# Patient Record
(demographics unavailable — no encounter records)

---

## 2025-04-23 NOTE — HISTORY OF PRESENT ILLNESS
[FreeTextEntry1] : 69y/o presents to discuss ovarian cyst Pt was seen by her PCP for back pain who recommended seeing a neurologist. Pt sent for back MRI by neurologist where ovarian cyst was noted (MRI result not available for review) Pt reports back pain has since resolved Pt reports constant pelvic pain as well as urinary frequency, denies dysuria  Denies weight loss, early satiety, night sweats   TVUS from outside provider reviewed: L ovarian cyst measuring 6.6x5.9x5.1 with hypoechoic and anechoic area Pt denies hx of ovarian cysts

## 2025-04-23 NOTE — PLAN
[FreeTextEntry1] : 69y/o presents with new finding of 6.6cm L ovarian cyst on pelvic ultrasound from 3/8/25 -Ultrasound sent by PCP, results scanned into chart, results discussed with pt   -Discussed that large ovarian cysts in the postmenopausal periods warrant additional workup  -Recommend tumor makers (, CEA, CA 19-9, HE4) and pelvic MRI, as recommended by radiology -Briefly discussed surgical removal with benign GYN or GYN oncology   #Urinary frequency -Discussed this may be related to space occupying cyst putting pressure on the bladder -Will sent UCx today   RTO after MRI  nga HARGROVE

## 2025-05-02 NOTE — PLAN
[FreeTextEntry1] : 69y/o presents for followup regarding left ovarian cyst -Tumor markers reviewed and negative -MRI reviewed and pt counseled on findings -Pt counseled that labs and imaging are reassuring for benign process but carcinoma cannot be definitively ruled out without pathology. Recommend removal -Discussed recommendation of BSO in the postmenopausal setting - pt agrees to plan -Offered referral to GYN Oncology, discussed that if a cancer was identified on frozen section during surgical removal, a GYN oncologist could perform additional staging and likely hysterectomy at the time of surgery. Pt to consider and will contact office next week to start surgical booking process vs referral to GYN oncology   nga HARGROVE

## 2025-05-02 NOTE — HISTORY OF PRESENT ILLNESS
[FreeTextEntry1] : This visit was provided via telehealth using real-time 2-way audio visual technology. The patient, JESSICA FLORES  , was located at home, [address] [place] [address] , at the time of the visit.  The provider, RA MAYO , was located at the medical office located in [facility] at the time of the visit. The patient JESSICA FLORES  and Provider participated in the telehealth encounter.  Verbal consent given on 05/02/2025  by the patient, self.   69y/o presents to discuss lab and MRI results regarding 6cm ovarian cyst Tumor markers reviewed: negative MRI reviewed: multilocular left ovarian cystic lesions without nodular or masslike enhancement  Results reviewed with pt Pt without any additional concerns today

## 2025-05-02 NOTE — HISTORY OF PRESENT ILLNESS
[FreeTextEntry1] : This visit was provided via telehealth using real-time 2-way audio visual technology. The patient, JESSICA FLORES  , was located at home, [address] [place] [address] , at the time of the visit.  The provider, RA MAYO , was located at the medical office located in [facility] at the time of the visit. The patient JESSICA FLORES  and Provider participated in the telehealth encounter.  Verbal consent given on 05/02/2025  by the patient, self.   67y/o presents to discuss lab and MRI results regarding 6cm ovarian cyst Tumor markers reviewed: negative MRI reviewed: multilocular left ovarian cystic lesions without nodular or masslike enhancement  Results reviewed with pt Pt without any additional concerns today

## 2025-05-30 NOTE — LETTER BODY
[Dear  ___] : Dear  [unfilled], [FreeTextEntry2] : This letter is to provide follow-up on Liliana Keller who is seen for evaluation of the left ovarian complex cyst.  She had associated marker assessment that was within normal limits.  She does not recall prior pelvic imaging though she is going to attempt to determine if this was done at a prior orthopedic evaluation.  We discussed the differential diagnosis and management options.  She was agreeable with proceeding with excision and possible staging.  We plan a minimally invasive approach with possible conversion to an open procedure.  The indication and nature of staging was discussed, as were perioperative and recuperative issues.  A preoperative medical assessment was recommended.  I will keep you informed as to her findings and disposition.

## 2025-05-30 NOTE — HISTORY OF PRESENT ILLNESS
[FreeTextEntry1] : Referred by PCP: Dr. Jessica Sauer Neuro: Dr. Ernie Munoz GYN: Dr. Apollo Bustillos GI: Dr BIBIANA Ramirez   Ms. FLORES is a 69 year old  female LMP age 50, referred from Dr. Jessica Sauer for further evaluation and management of a left ovarian cyst. Pt was seen by her PCP at the beginning of March for back pain who recommended seeing a neurologist. Pt sent for MRI of her back by neurologist, Dr. Ernie Munoz, where ovarian cyst was noted (MRI result not available for review). She reports back pain has since resolved but notes cramping and pelvic pain as well as urinary frequency.  25 MR Pelvis FINDINGS: UTERUS: Normal morphology. SIZE: 4 x 4.8 x 4.2 cm FIBROID: None. ENDOMETRIUM: Measures up to 3 mm. No focal mass. CERVIX: Multiple nabothian cysts. OVARIES: LEFT: There is a multilocular multiseptated T2 hyperintense left ovarian cystic lesion measures 6.5 x 5 cm with thin enhancing septations and no discrete definitive nodular or soft tissue enhancing components. RIGHT: No adnexal mass. BLADDER: Unremarkable. BOWEL: Extensive sigmoid diverticulosis without imaging findings of acute diverticulitis. SOFT TISSUES: No pelvic lymphadenopathy. BONES: Multilocular filum terminalis cysts. Mild degenerative disc disease. IMPRESSION: Multilocular left ovarian cystic lesion (6.5 cm) with thin enhancing septations but no definitive nodular or masslike enhancement. In the postmenopausal patient demographic, an ovarian neoplasm remains a differential consideration.  25 TM CA-125 = 14 Ca 19-9 = 13 CEA = 2.9 HE 4 = 61  3/8/25 TVUS ([facility]) Uterus: Anteverted. 5.5 x 2.2 x 4.3 cm Myometrium: Homogeneous without focal mass. Endometrial echo complex: Normal in thickness, measuring 3 mm. Adnexa: Right ovary: Again not visualized or evaluated. Left ovary: Enlarged in size measuring 6.7 x 6.5 x 5.6 cm Left ovarian cyst, measuring 6.6 x 5.9 x 5.1 cm. Combination of hypoechoic and anechoic areas. Other: No significant free fluid Impression: 6.6 cm left ovarian cyst, increased in size and complexity.  OBHx-  x 2 PMHx- Current smoker (1 pack/week, for over 40 years, working on quitting, offered [place] [employer] and pt declined), depression PSHx- Plastic surgical repair of avulsion injury to upper lip ([facility], 2023 by Dr. Porras), D&C x3 Family hx of cancer- Pt reports no family hx of cancer   HM Pap 2024 Negative/HPV negative Mammo- 2024 BI-RADS 2 Benign. BHM per Dr. Bustillos Colonoscopy- 2024 Benign findings per pt report

## 2025-05-30 NOTE — OB HISTORY
[Total Preg ___] : : [unfilled] [Living ___] : [unfilled] (living) [Vaginal ___] : [unfilled] vaginal delivery(s) [unknown] : the patient is unsure of the date of her LMP [Menarche Age ____] : age at menarche was [unfilled] [Menopause  Age ____] : menopause occurred at age [unfilled] [Abortions ___] : [unfilled] (abortions)

## 2025-05-30 NOTE — PHYSICAL EXAM
[MA] : MA [Absent] : CVAT: absent [Normal] : Recto-Vaginal Exam: Normal [FreeTextEntry2] : [place] [de-identified] : atrophy [de-identified] : The uterus was nonpalpable [de-identified] : The adnexae were nbnpalpable

## 2025-05-30 NOTE — PAST MEDICAL HISTORY
[Postmenopausal] : The patient is postmenopausal [Menarche Age ____] : age at menarche was [unfilled] [Menopause Age____] : age at menopause was [unfilled] [unknown] : the patient is unsure of the date of her LMP [Total Preg ___] : G[unfilled] [Live Births ___] : P[unfilled]  [Abortions ___] : Abortions:[unfilled] [Living ___] : Living: [unfilled] [AB Spont ___] : miscarriages: [unfilled]  [FreeTextEntry5] : D&C

## 2025-05-30 NOTE — PHYSICAL EXAM
[MA] : MA [Absent] : CVAT: absent [Normal] : Recto-Vaginal Exam: Normal [FreeTextEntry2] : [place] [de-identified] : atrophy [de-identified] : The uterus was nonpalpable [de-identified] : The adnexae were nbnpalpable

## 2025-05-30 NOTE — HISTORY OF PRESENT ILLNESS
[FreeTextEntry1] : Referred by PCP: Dr. Jessica Sauer Neuro: Dr. Ernie Munoz GYN: Dr. Apollo Bustillos GI: Dr BIBIANA Ramirez   Ms. FLORES is a 69 year old  female LMP age 50, referred from Dr. Jessica Sauer for further evaluation and management of a left ovarian cyst. Pt was seen by her PCP at the beginning of March for back pain who recommended seeing a neurologist. Pt sent for MRI of her back by neurologist, Dr. Ernie Munoz, where ovarian cyst was noted (MRI result not available for review). She reports back pain has since resolved but notes cramping and pelvic pain as well as urinary frequency.  25 MR Pelvis FINDINGS: UTERUS: Normal morphology. SIZE: 4 x 4.8 x 4.2 cm FIBROID: None. ENDOMETRIUM: Measures up to 3 mm. No focal mass. CERVIX: Multiple nabothian cysts. OVARIES: LEFT: There is a multilocular multiseptated T2 hyperintense left ovarian cystic lesion measures 6.5 x 5 cm with thin enhancing septations and no discrete definitive nodular or soft tissue enhancing components. RIGHT: No adnexal mass. BLADDER: Unremarkable. BOWEL: Extensive sigmoid diverticulosis without imaging findings of acute diverticulitis. SOFT TISSUES: No pelvic lymphadenopathy. BONES: Multilocular filum terminalis cysts. Mild degenerative disc disease. IMPRESSION: Multilocular left ovarian cystic lesion (6.5 cm) with thin enhancing septations but no definitive nodular or masslike enhancement. In the postmenopausal patient demographic, an ovarian neoplasm remains a differential consideration.  25 TM CA-125 = 14 Ca 19-9 = 13 CEA = 2.9 HE 4 = 61  3/8/25 TVUS ([facility]) Uterus: Anteverted. 5.5 x 2.2 x 4.3 cm Myometrium: Homogeneous without focal mass. Endometrial echo complex: Normal in thickness, measuring 3 mm. Adnexa: Right ovary: Again not visualized or evaluated. Left ovary: Enlarged in size measuring 6.7 x 6.5 x 5.6 cm Left ovarian cyst, measuring 6.6 x 5.9 x 5.1 cm. Combination of hypoechoic and anechoic areas. Other: No significant free fluid Impression: 6.6 cm left ovarian cyst, increased in size and complexity.  OBHx-  x 2 PMHx- Current smoker (1 pack/week, for over 40 years, working on quitting, offered [place] [employer] and [place] declined), depression PSHx- Plastic surgical repair of avulsion injury to upper lip ([facility], 2023 by Dr. Porras), D&C x3 Family hx of cancer- Pt reports no family hx of cancer   HM Pap 2024 Negative/HPV negative Mammo- 2024 BI-RADS 2 Benign. BHM per Dr. Bustillos Colonoscopy- 2024 Benign findings per pt report

## 2025-05-30 NOTE — DISCUSSION/SUMMARY
[Reviewed Clinical Lab Test(s)] : Results of clinical tests were reviewed. [Reviewed Radiology Report(s)] : Radiology reports were reviewed. [FreeTextEntry1] : I met with the pt. -We discussed the clinical findings and the differential diagnosis.  We discussed the results and interpretation of her marker panel.  The imaging studies were reviewed, and she notes no prior available studies though she will try and determine if pelvic imaging was obtained at a prior orthopedic evaluation. -Management options were reviewed, including my advice for excision and possible staging.  We discussed the rationale for BSO.  My recommendation for a minimally invasive-laparoscopic procedure was discussed with possible conversion to an open procedure (vertical incision), with a D&C and placement of uterine manipulator (though her EMS is then and she has no bleeding reported).  We discussed the indication for nature of surgical staging as well as the role and rationale for hysterectomy pending the intraoperative findings.  The potential need for adjuvant therapy, should a malignancy be identified was discussed. -Periop and recuperative issues were discussed, as were the possible hazards and risks of surgery, including but not limited to infection, thromboembolic disease and its life-threatening nature, transfusion, and surrounding organ injury (urinary, bowel, vascular, and neural), incision issues. -A diagram was drawn, and a copy provided. -I advised a clearance. -I advised further EoD imaging and an Rx provided for a CT-abd, and the [facility] rad contact info. -Her instructions were reviewed. -All Q/A. -She will set up a TH preop to disc results and answer any additional questions -CJ tasked.  -Effort for the visit includes the note prep, review of prior material, interview, exam, further documentation, and coordination of care.

## 2025-06-18 NOTE — DISCUSSION/SUMMARY
[Reviewed Clinical Lab Test(s)] : Results of clinical tests were reviewed. [Reviewed Radiology Report(s)] : Radiology reports were reviewed. [FreeTextEntry1] : I met with the pt using the [employer] platform but that dropped. Then used [employer] and pt connected only with audio. Toptal time ~10min, plus time for documentation and coordination of care.  -We discussed the clinical findings and prior imaging as well as the markers. We disc her CT which I looked at, but we still await the radiology report. CT1 tasked to req the reading.  -Management plan was reviewed, incl D&C, L/S BSO, poss Ex Lap, poss Hyst, poss Staging. We disc poss need to convert to an open proc.   -Periop and recuperative issues were discussed.   -We have disc a clearance. -Her instructions were reviewed. -All Q/A. -She will call for the CT findings and any other questions/concerns.  -Effort for the visit includes the note prep, review of prior material, interview, exam, further documentation, and coordination of care.

## 2025-06-18 NOTE — REASON FOR VISIT
[Home] : at home, [unfilled] , at the time of the visit. [Medical Office: ([facility])___] : at the medical office located in  [Verbal consent obtained from patient] : the patient, [unfilled] [This encounter was initiated by telehealth (audio with video) and converted to telephone (audio only)] : This encounter was initiated by telehealth (audio with video) and converted to telephone (audio only) [Technical] : patient unable to effectively utilize tele-video due to technical issues. [FreeTextEntry1] : Follow up

## 2025-06-18 NOTE — HISTORY OF PRESENT ILLNESS
[FreeTextEntry1] : Referred by PCP: Dr. Jessica Sauer Neuro: Dr. Ernie Munoz GYN: Dr. Apollo Bustillos GI: Dr BIBIANA Ramirez   Ms. FLORES is a 69 year old  female LMP age 50, referred from Dr. Jessica Sauer for further evaluation and management of a left ovarian cyst. Pt was seen by her PCP at the beginning of March for back pain who recommended seeing a neurologist. Pt sent for MRI of her back by neurologist, Dr. Ernie Munoz, where ovarian cyst was noted (MRI result not available for review). She reports back pain has since resolved but notes cramping and pelvic pain as well as urinary frequency.  25 MR Pelvis FINDINGS: UTERUS: Normal morphology. SIZE: 4 x 4.8 x 4.2 cm FIBROID: None. ENDOMETRIUM: Measures up to 3 mm. No focal mass. CERVIX: Multiple nabothian cysts. OVARIES: LEFT: There is a multilocular multiseptated T2 hyperintense left ovarian cystic lesion measures 6.5 x 5 cm with thin enhancing septations and no discrete definitive nodular or soft tissue enhancing components. RIGHT: No adnexal mass. BLADDER: Unremarkable. BOWEL: Extensive sigmoid diverticulosis without imaging findings of acute diverticulitis. SOFT TISSUES: No pelvic lymphadenopathy. BONES: Multilocular filum terminalis cysts. Mild degenerative disc disease. IMPRESSION: Multilocular left ovarian cystic lesion (6.5 cm) with thin enhancing septations but no definitive nodular or masslike enhancement. In the postmenopausal patient demographic, an ovarian neoplasm remains a differential consideration.  25 TM CA-125 = 14 Ca 19-9 = 13 CEA = 2.9 HE 4 = 61  3/8/25 TVUS ([facility]) Uterus: Anteverted. 5.5 x 2.2 x 4.3 cm Myometrium: Homogeneous without focal mass. Endometrial echo complex: Normal in thickness, measuring 3 mm. Adnexa: Right ovary: Again not visualized or evaluated. Left ovary: Enlarged in size measuring 6.7 x 6.5 x 5.6 cm Left ovarian cyst, measuring 6.6 x 5.9 x 5.1 cm. Combination of hypoechoic and anechoic areas. Other: No significant free fluid Impression: 6.6 cm left ovarian cyst, increased in size and complexity.  OBHx-  x 2 PMHx- Current smoker (1 pack/week, for over 40 years, working on quitting, offered [place] [employer] and [place] declined), depression PSHx- Plastic surgical repair of avulsion injury to upper lip ([facility], 2023 by Dr. Porras), D&C x3 Family hx of cancer- Pt reports no family hx of cancer   HM Pap 2024 Negative/HPV negative Mammo- 2024 BI-RADS 2 Benign. BHM per Dr. Bustillos Colonoscopy- 2024 Benign findings per pt report

## 2025-07-28 NOTE — REASON FOR VISIT
[Post Op] : post op visit [de-identified] : 7/10/25 [de-identified] : D&C, L/S, Ex Lap BSO, LINDEN [de-identified] : She reports feeling well, noting no VB, VS, or pain. No fevers. No GI or  concerns at this time. She walked on the track this am!

## 2025-07-28 NOTE — ASSESSMENT
[FreeTextEntry1] : She is recuperating well and we discussed her path; a copy was provided to her (advised to bring when she sees her PCP next). Her instructions were discussed. All Q/A. She'll RTO prn and see her primary Gyn for ongoing care. CJ tasked.

## 2025-07-28 NOTE — DISCUSSION/SUMMARY
[Clean] : was clean [Dry] : was dry [Intact] : was intact [Erythema] : was not erythematous [Ecchymosis] : was not ecchymotic [None] : had no drainage [Normal Skin] : normal appearance [Doing Well] : is doing well [FreeTextEntry9] : s/nt/nd; no CVAT; no edema [de-identified] : Deferred